# Patient Record
(demographics unavailable — no encounter records)

---

## 2024-10-10 NOTE — HISTORY OF PRESENT ILLNESS
[FreeTextEntry1] : The patient is an 81 year old female here today for a post op visit s/p closure of right breast wound (DOS: 7/9/2024). The patient is accompanied by her neighbor at this appointment. Patient states she is feeling well other than experiencing limited mobility of her right shoulder. Patient has no other concerns or complaints at this time. Patient denies bleeding, discharge, fever or chills.

## 2024-10-10 NOTE — PHYSICAL EXAM
[de-identified] : Right breast incision healing well. Small lateral seroma. No evidence of wound breakdown or signs of infection

## 2024-10-23 NOTE — HISTORY OF PRESENT ILLNESS
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 Red and itchy. No treatments tried prior to initial presentation  # Seborrheic dermatitis  Since LV, using hydrocortisone 2.5% ointment BID to affected areas on face. Also using keto shampoo 2x per week on face and scalp. Improved on face but scalp is still very itchy   #Itchy new rash on the right thigh  Started ~1 month ago, very itchy. Using Gold Bond to the area. No other treatments. Also moisturizes with Cetaphil

## 2024-10-23 NOTE — PHYSICAL EXAM
[Alert] : alert [Oriented x 3] : ~L oriented x 3 [Well Nourished] : well nourished [Conjunctiva Non-injected] : conjunctiva non-injected [No Visual Lymphadenopathy] : no visual  lymphadenopathy [No Clubbing] : no clubbing [No Edema] : no edema [No Bromhidrosis] : no bromhidrosis [No Chromhidrosis] : no chromhidrosis [FreeTextEntry3] : Erythematous greasy scaly patches on the glabella, upper lip, chin, ears, nasolabial folds admixed with hypopigmented and hyperpigmented patches  Eczematous patches and plaques on the right lateral thigh and chest  Xerosis

## 2024-11-26 NOTE — REASON FOR VISIT
Suture removal    Date/Time: 8/25/2022 9:24 AM  Performed by: Al Torres RN  Authorized by: Ashish Monroy MD   Universal Protocol:  Consent: Verbal consent obtained  Written consent not obtained  Risks and benefits: risks, benefits and alternatives were discussed  Consent given by: patient  Time out: Immediately prior to procedure a "time out" was called to verify the correct patient, procedure, equipment, support staff and site/side marked as required  Timeout called at: 8/25/2022 9:24 AM   Patient understanding: patient states understanding of the procedure being performed  Patient consent: the patient's understanding of the procedure matches consent given  Procedure consent: procedure consent matches procedure scheduled  Relevant documents: relevant documents present and verified  Test results: test results available and properly labeled  Site marked: the operative site was marked  Radiology Images displayed and confirmed  If images not available, report reviewed: imaging studies not available  Patient identity confirmed: verbally with patient        Patient location:  Clinic  Location:     Location:  Trunk    Trunk location:  Back (Lower mid back)  Procedure details: Tools used:  Suture removal kit    Wound appearance:  No sign(s) of infection, good wound healing, pink and nontender    Number of sutures removed:  5  Post-procedure details:     Post-removal:  Band-Aid applied (Vaseline applied )    Patient tolerance of procedure: Tolerated well, no immediate complications  Comments:      Patient scheduled for another MOHS procedure on 9/6 with Dr Magnolia Magana 
[FreeTextEntry2] : Right axillary LN that was + for invasive breast cancer.
[FreeTextEntry2] : Right axillary LN that was + for invasive breast cancer.

## 2024-11-26 NOTE — PHYSICAL EXAM
[de-identified] : Right chest wall; healing incisions, extensive scar tissues and pockets of seromas; "doggy ear" noted right axilla; left breast with no palpable masses no nipple retraction; no skin lesions [de-identified] : Severe right upper extremity swelling from upper arm down into hand; no redness; no palpable lesions in left axilla; extensive scarring from prior surgeries;  Bilateral lower extremity swelling 2+ [de-identified] : Restricted ROM of right arm with shoulder extension;  s/p left arm amputation

## 2024-11-26 NOTE — PHYSICAL EXAM
[de-identified] : Right chest wall; healing incisions, extensive scar tissues and pockets of seromas; "doggy ear" noted right axilla; left breast with no palpable masses no nipple retraction; no skin lesions [de-identified] : Severe right upper extremity swelling from upper arm down into hand; no redness; no palpable lesions in left axilla; extensive scarring from prior surgeries;  Bilateral lower extremity swelling 2+ [de-identified] : Restricted ROM of right arm with shoulder extension;  s/p left arm amputation

## 2024-11-26 NOTE — ASSESSMENT
[FreeTextEntry1] : 82 yo woman with history of soft tissue tumor in the left arm in 1970s, underwent surgery, s/p RT and ultimately required amputation (no chemotherapy), incidentally found to have right axillary LAD with path c/w involvement with invasive lobular carcinoma but no primary breast lesion noted. - underwent MRI breast which showed 6mm right breast lesion; biopsy of the lesion found to be benign fibroma - PET/CT in 5/2024 with no evidence of metastatic disease - [facility] Genetic testing 5/2024 - TSC2 VUS;  - given that only 1 out of 13 axillary LN involved and no invasive component in the breast, no role for radiation therapy - plan to initiate endocrine therapy with AI for period of 7-10 years (started 8/2024) -would recommend to start with anastrozole 1 mg daily with plan for about 7 years of therapy based on results of the [facility] Breast and Colorectal Cancer Study Group; ABCSG-16/SALSA published in N Engl J Med 2021;385:395-405. The study showed that extending endocrine therapy for a total of 10 provided no benefit over a total of 7 years, but 10 years of therapy was associated with greater risk of bone fractures. - risks/benefits/side effects including but not limited to increased risk of osteoporosis, worsening arthralgia/myalgia, rise in LFTs that will need to be monitored every 6 months and worsening lipid profile that will need to be monitored by PMD - BMD 8/29/2024: mild osteopenia T-1.5 - Patient and family had the opportunity to have all their questions answered to their satisfaction  #New - Lymphedema - r/o right axillary adenopathy as cause of sudden onset lymphedema - Doppler Right upper extremity to r/o DVT - Referral to [facility] Lymphedema therapy - social work to assist patient with commode in the home as patient is limited in caring for herself - Requested assistance from Breast Navigation with scheduling  # New - Lower Extremity Edema -Bilateral lower extremity Doppler - ordered  # Brain Fog - possibly due to anastrozole - discussed w/ Nellie change of treatment to a different AI - but patient prefers to stay on anastrozole at this time - patient instructed to notify our office if symptoms progress  #Post-operative Skin/seroma - Patient seen by plastics yesterday - no concern for infection - recommend f/up with plastics if changes in surgical scar or increased size of seroma

## 2024-11-26 NOTE — ASSESSMENT
[FreeTextEntry1] : 82 yo woman with history of soft tissue tumor in the left arm in 1970s, underwent surgery, s/p RT and ultimately required amputation (no chemotherapy), incidentally found to have right axillary LAD with path c/w involvement with invasive lobular carcinoma but no primary breast lesion noted. - underwent MRI breast which showed 6mm right breast lesion; biopsy of the lesion found to be benign fibroma - PET/CT in 5/2024 with no evidence of metastatic disease - [employer] Genetic testing 5/2024 - TSC2 VUS;  - given that only 1 out of 13 axillary LN involved and no invasive component in the breast, no role for radiation therapy - plan to initiate endocrine therapy with AI for period of 7-10 years (started 8/2024) -would recommend to start with anastrozole 1 mg daily with plan for about 7 years of therapy based on results of the [facility] Breast and Colorectal Cancer Study Group; ABCSG-16/SALSA published in N Engl J Med 2021;385:395-405. The study showed that extending endocrine therapy for a total of 10 provided no benefit over a total of 7 years, but 10 years of therapy was associated with greater risk of bone fractures. - risks/benefits/side effects including but not limited to increased risk of osteoporosis, worsening arthralgia/myalgia, rise in LFTs that will need to be monitored every 6 months and worsening lipid profile that will need to be monitored by PMD - BMD 8/29/2024: mild osteopenia T-1.5 - Patient and family had the opportunity to have all their questions answered to their satisfaction  #New - Lymphedema - r/o right axillary adenopathy as cause of sudden onset lymphedema - Doppler Right upper extremity to r/o DVT - Referral to [facility] Lymphedema therapy - social work to assist patient with commode in the home as patient is limited in caring for herself - Requested assistance from Breast Navigation with scheduling  # New - Lower Extremity Edema -Bilateral lower extremity Doppler - ordered  # Brain Fog - possibly due to anastrozole - discussed w/ Nellie change of treatment to a different AI - but patient prefers to stay on anastrozole at this time - patient instructed to notify our office if symptoms progress  #Post-operative Skin/seroma - Patient seen by plastics yesterday - no concern for infection - recommend f/up with plastics if changes in surgical scar or increased size of seroma

## 2024-11-26 NOTE — HISTORY OF PRESENT ILLNESS
[de-identified] : Nellie is a pleasant 81 year-old female here for initial evaluation of breast cancer referred by Dr. Chaim Shafer. She has had prior right-sided benign breast biopsy.  Her history is also significant for cancer in the muscle of the arm in the 1970's for which she underwent multiple surgeries, radiation and ultimately amputation of the left arm.  She denies palpable breast masses, nipple discharge, inversion or skin changes.    RECENT IMAGING: Bilateral screening mammogram/sonogram 2023: (BIRADS 4) -Left 10:00 N8 questionable architectural distortion--> f/u left diagnostic mammogram/sonogram -Left 10:00 N4 1.4 cm mass may correspond to mammographic finding--> f/u targeted ultrasound -Right axillary LN with cortical thickening--> ultrasound-guided biopsy  Left diagnostic mammogram/sonogram 2024: (BIRADS 4)--> f/u stereotactic biopsy -L architectural distortion posterior to course calcification -L superior N6 incidental second area of architectural distortion  Stereotactic biopsy left inner central architectural distortion 2024 (cork clip)- benign & concordant.  Ultrasound-guided bx right axillary LN 2024- metastatic carcinoma involving lymph node; ER+90%, UT+84%HER2 neg (IHC 0), Ki67 - 47%  MRI - BREAST 4/15/24  IMPRESSION: - 6 mm enhancing mass in the upper central right breast middle depth which is indeterminate. MRI guided biopsy is recommended in light of the recent diagnosis of metastatic involvement of the right axillary lymph node. - Small hematoma in the lower inner left breast with associated biopsy clip at site of recent benign stereotactic biopsy. No suspicious enhancing findings in the left breast. - 1.9 cm morphologically abnormal appearing right axillary lymph node containing a clip and corresponding to the site of biopsy-proven metastatic disease. No left axillary lymphadenopathy or internal mammary lymphadenopathy is appreciated.  24 - MRI guided right biopsy of right breast - fribroadenoma  PET/CT on 24 -  1. FDG-avid enlarged right axillary lymph node containing a biopsy clip corresponds to biopsy-proven malignancy. 2. Nonspecific 3 mm right upper lobe pulmonary nodule, too small to characterize with PET. A 6 month follow-up with dedicated CT of chest may be obtained for further evaluation 3. FDG-avid advanced degenerative changes, right glenohumeral joint. 4. Nonspecific focus of mild FDG activity, posterolateral aspect right fifth rib, without corresponding abnormality on CT. Correlate clinically for history of trauma/pain.  24 - Breast biopsies Final Diagnosis 1. Breast, right, 11 o'clock 7 cmfn, core biopsy - Benign breast tissue with fibroadenomatoid change - Scar tissue and fat necrosis, compatible with biopsy site changes  2. Breast, left, 10 o'clock 5 cmfn, core biopsy: - Benign breast tissue with fibroadenomatoid change with stromal calcifications - Scar tissue and fat necrosis, compatible with biopsy site changes  Underwent Right breast mastectomy with Dr. Shafer on 24 - final path Breast, right, mastectomy and ALND - carcinoma with lobular morphology involving one of thirteen lymph nodes (); measures 15 mm in greatest dimension; no extracapsular extension - Within the breast tissue; LCIS and ADH; no invasive component seen  RISK ASSESSMENT , 1st pregnancy at age 21, Menarche at age 13; unknown menopause as she had hysterectomy for fibroids Family Hx: maternal grandmother had tumor on her back and mother unspecified malignancy (but not breast or ovarian ca).  [de-identified] : 11/26/2024 Nellie presents emergently for new onset right upper extremity lymphedema that start 3 days ago.  She notes heaviness, no pain.  She is very distressed by this symptom given that she had a right arm amputation and this is her only means for self care.  She is unable to dress, bathe herself without help.  She has an aide 4/7 days of the week.  Started anastrozole approximately 8/18/2024 - c/o brain fog and fatigue; no arthralgias, no hotflashes c/o rash on face, chest and neck - being treated by Dr. Templeton - patient notes improvement in rash since starting medication from derm notes restricted range of motion of right arm  also notes excess skin laterally under right arm and swelling of right mastectomy site  c/o new right upper extremity swellig since Monday - no pain, but restriction of motion commode and shower chair**  [FreeTextEntry1] : Limited by pain from left upper extremity amputation

## 2024-11-26 NOTE — HISTORY OF PRESENT ILLNESS
[de-identified] : Nellie is a pleasant 81 year-old female here for initial evaluation of breast cancer referred by Dr. Chaim Shafer. She has had prior right-sided benign breast biopsy.  Her history is also significant for cancer in the muscle of the arm in the 1970's for which she underwent multiple surgeries, radiation and ultimately amputation of the left arm.  She denies palpable breast masses, nipple discharge, inversion or skin changes.    RECENT IMAGING: Bilateral screening mammogram/sonogram 2023: (BIRADS 4) -Left 10:00 N8 questionable architectural distortion--> f/u left diagnostic mammogram/sonogram -Left 10:00 N4 1.4 cm mass may correspond to mammographic finding--> f/u targeted ultrasound -Right axillary LN with cortical thickening--> ultrasound-guided biopsy  Left diagnostic mammogram/sonogram 2024: (BIRADS 4)--> f/u stereotactic biopsy -L architectural distortion posterior to course calcification -L superior N6 incidental second area of architectural distortion  Stereotactic biopsy left inner central architectural distortion 2024 (cork clip)- benign & concordant.  Ultrasound-guided bx right axillary LN 2024- metastatic carcinoma involving lymph node; ER+90%, MI+84%HER2 neg (IHC 0), Ki67 - 47%  MRI - BREAST 4/15/24  IMPRESSION: - 6 mm enhancing mass in the upper central right breast middle depth which is indeterminate. MRI guided biopsy is recommended in light of the recent diagnosis of metastatic involvement of the right axillary lymph node. - Small hematoma in the lower inner left breast with associated biopsy clip at site of recent benign stereotactic biopsy. No suspicious enhancing findings in the left breast. - 1.9 cm morphologically abnormal appearing right axillary lymph node containing a clip and corresponding to the site of biopsy-proven metastatic disease. No left axillary lymphadenopathy or internal mammary lymphadenopathy is appreciated.  24 - MRI guided right biopsy of right breast - fribroadenoma  PET/CT on 24 -  1. FDG-avid enlarged right axillary lymph node containing a biopsy clip corresponds to biopsy-proven malignancy. 2. Nonspecific 3 mm right upper lobe pulmonary nodule, too small to characterize with PET. A 6 month follow-up with dedicated CT of chest may be obtained for further evaluation 3. FDG-avid advanced degenerative changes, right glenohumeral joint. 4. Nonspecific focus of mild FDG activity, posterolateral aspect right fifth rib, without corresponding abnormality on CT. Correlate clinically for history of trauma/pain.  24 - Breast biopsies Final Diagnosis 1. Breast, right, 11 o'clock 7 cmfn, core biopsy - Benign breast tissue with fibroadenomatoid change - Scar tissue and fat necrosis, compatible with biopsy site changes  2. Breast, left, 10 o'clock 5 cmfn, core biopsy: - Benign breast tissue with fibroadenomatoid change with stromal calcifications - Scar tissue and fat necrosis, compatible with biopsy site changes  Underwent Right breast mastectomy with Dr. Shafer on 24 - final path Breast, right, mastectomy and ALND - carcinoma with lobular morphology involving one of thirteen lymph nodes (); measures 15 mm in greatest dimension; no extracapsular extension - Within the breast tissue; LCIS and ADH; no invasive component seen  RISK ASSESSMENT , 1st pregnancy at age 21, Menarche at age 13; unknown menopause as she had hysterectomy for fibroids Family Hx: maternal grandmother had tumor on her back and mother unspecified malignancy (but not breast or ovarian ca).  [de-identified] : 11/26/2024 Nellie presents emergently for new onset right upper extremity lymphedema that start 3 days ago.  She notes heaviness, no pain.  She is very distressed by this symptom given that she had a right arm amputation and this is her only means for self care.  She is unable to dress, bathe herself without help.  She has an aide 4/7 days of the week.  Started anastrozole approximately 8/18/2024 - c/o brain fog and fatigue; no arthralgias, no hotflashes c/o rash on face, chest and neck - being treated by Dr. Templeton - patient notes improvement in rash since starting medication from derm notes restricted range of motion of right arm  also notes excess skin laterally under right arm and swelling of right mastectomy site  c/o new right upper extremity swellig since Monday - no pain, but restriction of motion commode and shower chair**  [FreeTextEntry1] : Limited by pain from left upper extremity amputation

## 2024-11-26 NOTE — REVIEW OF SYSTEMS
[FreeTextEntry9] : arthritis pain; s/p left arm amputationg;  [de-identified] : lymphedema right arm

## 2024-11-26 NOTE — REVIEW OF SYSTEMS
[FreeTextEntry9] : arthritis pain; s/p left arm amputationg;  [de-identified] : lymphedema right arm

## 2025-01-06 NOTE — HISTORY OF PRESENT ILLNESS
[de-identified] : Nellie is a pleasant 82-year-old female, referred by YOSELIN Williamson for consultation regarding R breast cancer, now s/p R radical mastectomy on 24, here for a follow-up visit.   Prior right-sided benign breast biopsy.   Bilateral screening mammogram/sonogram 2023: (BIRADS 4) -Left 10:00 N8 questionable architectural distortion--> f/u left diagnostic mammogram/sonogram -Left 10:00 N4 1.4 cm mass may correspond to mammographic finding--> f/u targeted ultrasound -Right axillary LN with cortical thickening--> ultrasound-guided biopsy  Left diagnostic mammogram/sonogram 2024: (BIRADS 4)--> f/u stereotactic biopsy -L architectural distortion posterior to course calcification -L superior N6 incidental second area of architectural distortion  Stereotactic biopsy left inner central architectural distortion 2024 (cork clip)- benign & concordant.  Ultrasound-guided bx right axillary LN 2024- metastatic carcinoma involving lymph node, ER+ (>90%), LA+(84%), HER2 negative.  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 Social Hx: nonsmoker, ETOH rarely, assisted by SIMONA Ramos two days per week, lives alone ECO-1 limited by pain from R shoulder arthritis  She denies palpable breast masses, nipple discharge, inversion or skin changes.   Breast MRI 2024: -R upper central 6 mm enhancing mass, indeterminate--> f/u MRI biopsy -L lower inner small hematoma @ site of recent benign stereo bx -R 1.9 cm abnormal appearing axillary LN at site of bx-proven metastatic disease  MRI-guided right breast biopsy 2024: -R upper central (buckle): fibroadenoma - benign & concordant   genetics 2024 - negative for BRCA, VUS to TSC2  PET/CT 2024 - FDG-avid enlarged right axillary lymph node containing a bx clip, corresponds to a bx proven malignancy  - nonspecific 3 mm RUL pulm nodule, too small to characterize w/ PET -> f/u chest CT in 6 months  - FDG avid degenerative changes, right glenohumeral joint - nonspecific focus of mild FDG activity, posterolateral aspect R 5th rib w/o corresponding CT abnormality, correlate clinically for hx of trauma/pain - the remainder of the study demonstrates no evidence of FDG- avid disease, a primary lesion is not identified   2024- Nellie returns to discuss recent imaging and next steps.  She is accompanied by her HHA Rachel and her friend and neighbor Nancy who is a breast cancer survivor and underwent surgery with Dr Jud Valle in . She was evaluated by Dr. Alejandra Harrison on 2024 and pending PET/CT findings would finalize plan.  If localized disease, would plan for surgical excision, adjuvant RT and endocrine therapy with AI for period of 7-10 years.  If she has metastatic disease, would plan for AI with ribociclib.  She also sent genetic testing which is still pending.  We discussed the recent MRI findings, and MRI breast biopsy results being benign.  We will review the imaging with radiology and assess is additional imaging will better identify the potential primary lesion.  We also discussed the treatment algorithm for breast cancers of unknown primary.    B/L U/S & Right diagnostic mammo 2024 - area of distortion in the R UOQ, appears more prominent than prior mammo, likely corresponds to an irregular hypoechoic focus at 11:00 on sono -> f/u R U/S bx recommended - newly seen irregular suspicious hypoechoic mass to L 10:00 N5 -> f/u L U/S bx recommended BIRADS 4C   U/S biopsies 2024 - R 11:00 N7 (ribbon): fibroadenoma - DISCORDANT* - L 10:00 N5 (coil): benign breast tissue, benign & concordant  2024 - Nellie returns to discuss next steps after her recent biopsies. She is accompanied by her son Ted and his wife, Catherine, who is also Nellie's HCP. We discussed the right breast cancer with john disease with an unclear primary site.  We discussed options including systemic therapy, close observation, axillary dissection, and a modified radical mastectomy.  Nellie is in favor of a modified radical mastectomy given her desire for definitive treatment. We discussed the risks, benefits and alternatives of the procedure.  We also discussed post operative expectations and possible complications.  She expresses understanding and agrees to proceed.  **SURGERY** Nellie is s/p a right modified radical mastectomy on 2024, path: no residual invasive carcinoma to breast, metastatic carcinoma w/ lobular features to  LN (1.5 cm in greatest dimension), ADH, LCIS, FEA & benign nipple & skin, pT0 N1a   2024 - Nellie returns for an initial post-op visit, she is recovering appropriately. She followed w/ Dr. Ny post-op and had one drain removed, the second drain fell out incidentally earlier this week, but the site continued draining on its own until it tapered off. There is a large seroma to the chest wall, but it is not bothersome to her at all. Overall, she is recovering well.  We discussed Nellie's final pathology. She will follow-up with Dr. Harrison to discuss adjuvant options. She will also continue follow-up with Dr. Ny to manage the incision & chest wall seroma.   Nellie is s/p IR drainage of the chest wall seroma on 2024 (w/ approx 268 cc drained) She also consulted w/ Dr. Harrison post-op who recommends Anastrozole for adjuvant therapy   2024 - Nellie returns for ongoing post-op care, she is scheduled to see Dr. Ny tomorrow as well. She started Anastrozole under Dr. Harrison and is tolerating well. Nellie will follow up in 4 months.  She will undergo left breast ultrasound in November.   Nellie was admitted to [facility] in late 2024 w/ RUE & B/L LE edema and worsening PRETTY - found to have an NSTEMI s/p cath on 2024 w/ PCI stent, discharged home on ASA & Plavix.

## 2025-01-06 NOTE — ASSESSMENT
[FreeTextEntry1] :   All medical entries were at my, Dr. Chaim Shafer, direction.  I have reviewed the chart and agree that the record accurately reflects my personal performance of the history, physical exam, assessment and plan.  Our office nurse practitioner was present for the duration of the office visit.

## 2025-01-06 NOTE — CONSULT LETTER
[Dear  ___] : Dear  [unfilled], [Consult Letter:] : I had the pleasure of evaluating your patient, [unfilled]. [Please see my note below.] : Please see my note below. [Consult Closing:] : Thank you very much for allowing me to participate in the care of this patient.  If you have any questions, please do not hesitate to contact me. [Sincerely,] : Sincerely, [FreeTextEntry2] : Edward Williamson PA [DrDahiana  ___] : Dr. HUTCHINSON [FreeTextEntry3] : Chaim Shafer MD Surgical Oncology [facility] Office: [phone] Cell: [phone]

## 2025-01-06 NOTE — PHYSICAL EXAM
[FreeTextEntry1] : SS present during physical exam.  [Normal] : well developed, well nourished, in no acute distress [de-identified] : right chest wall with large seroma, no redness or tenderness

## 2025-01-22 NOTE — PHYSICAL EXAM
[FreeTextEntry1] : SS present during physical exam.  [de-identified] : right chest wall with large seroma, no redness or tenderness

## 2025-01-22 NOTE — CONSULT LETTER
[FreeTextEntry2] : Edward Williamson PA [FreeTextEntry3] : Chaim Shafer MD Surgical Oncology [facility] Office: [phone] Cell: [phone]

## 2025-01-22 NOTE — HISTORY OF PRESENT ILLNESS
[de-identified] : Nellie is a pleasant 82-year-old female, referred by YOSELIN Williamson for consultation regarding R breast cancer, now s/p R radical mastectomy on 24, here for a follow-up visit.   Prior right-sided benign breast biopsy.   Bilateral screening mammogram/sonogram 2023: (BIRADS 4) -Left 10:00 N8 questionable architectural distortion--> f/u left diagnostic mammogram/sonogram -Left 10:00 N4 1.4 cm mass may correspond to mammographic finding--> f/u targeted ultrasound -Right axillary LN with cortical thickening--> ultrasound-guided biopsy  Left diagnostic mammogram/sonogram 2024: (BIRADS 4)--> f/u stereotactic biopsy -L architectural distortion posterior to course calcification -L superior N6 incidental second area of architectural distortion  Stereotactic biopsy left inner central architectural distortion 2024 (cork clip)- benign & concordant.  Ultrasound-guided bx right axillary LN 2024- metastatic carcinoma involving lymph node, ER+ (>90%), DE+(84%), HER2 negative.  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 Social Hx: nonsmoker, ETOH rarely, assisted by SIMONA Ramos two days per week, lives alone ECO-1 limited by pain from R shoulder arthritis  She denies palpable breast masses, nipple discharge, inversion or skin changes.   Breast MRI 2024: -R upper central 6 mm enhancing mass, indeterminate--> f/u MRI biopsy -L lower inner small hematoma @ site of recent benign stereo bx -R 1.9 cm abnormal appearing axillary LN at site of bx-proven metastatic disease  MRI-guided right breast biopsy 2024: -R upper central (buckle): fibroadenoma - benign & concordant   genetics 2024 - negative for BRCA, VUS to TSC2  PET/CT 2024 - FDG-avid enlarged right axillary lymph node containing a bx clip, corresponds to a bx proven malignancy  - nonspecific 3 mm RUL pulm nodule, too small to characterize w/ PET -> f/u chest CT in 6 months  - FDG avid degenerative changes, right glenohumeral joint - nonspecific focus of mild FDG activity, posterolateral aspect R 5th rib w/o corresponding CT abnormality, correlate clinically for hx of trauma/pain - the remainder of the study demonstrates no evidence of FDG- avid disease, a primary lesion is not identified   2024- Nellie returns to discuss recent imaging and next steps.  She is accompanied by her HHA Rachel and her friend and neighbor Nancy who is a breast cancer survivor and underwent surgery with Dr Jud Valle in . She was evaluated by Dr. Alejandra Harrison on 2024 and pending PET/CT findings would finalize plan.  If localized disease, would plan for surgical excision, adjuvant RT and endocrine therapy with AI for period of 7-10 years.  If she has metastatic disease, would plan for AI with ribociclib.  She also sent genetic testing which is still pending.  We discussed the recent MRI findings, and MRI breast biopsy results being benign.  We will review the imaging with radiology and assess is additional imaging will better identify the potential primary lesion.  We also discussed the treatment algorithm for breast cancers of unknown primary.    B/L U/S & Right diagnostic mammo 2024 - area of distortion in the R UOQ, appears more prominent than prior mammo, likely corresponds to an irregular hypoechoic focus at 11:00 on sono -> f/u R U/S bx recommended - newly seen irregular suspicious hypoechoic mass to L 10:00 N5 -> f/u L U/S bx recommended BIRADS 4C   U/S biopsies 2024 - R 11:00 N7 (ribbon): fibroadenoma - DISCORDANT* - L 10:00 N5 (coil): benign breast tissue, benign & concordant  2024 - Nellie returns to discuss next steps after her recent biopsies. She is accompanied by her son Ted and his wife, Catherine, who is also Nellie's HCP. We discussed the right breast cancer with john disease with an unclear primary site.  We discussed options including systemic therapy, close observation, axillary dissection, and a modified radical mastectomy.  Nellie is in favor of a modified radical mastectomy given her desire for definitive treatment. We discussed the risks, benefits and alternatives of the procedure.  We also discussed post operative expectations and possible complications.  She expresses understanding and agrees to proceed.  **SURGERY** Nellie is s/p a right modified radical mastectomy on 2024, path: no residual invasive carcinoma to breast, metastatic carcinoma w/ lobular features to  LN (1.5 cm in greatest dimension), ADH, LCIS, FEA & benign nipple & skin, pT0 N1a   2024 - Nellie returns for an initial post-op visit, she is recovering appropriately. She followed w/ Dr. Ny post-op and had one drain removed, the second drain fell out incidentally earlier this week, but the site continued draining on its own until it tapered off. There is a large seroma to the chest wall, but it is not bothersome to her at all. Overall, she is recovering well.  We discussed Nellie's final pathology. She will follow-up with Dr. Harrison to discuss adjuvant options. She will also continue follow-up with Dr. Ny to manage the incision & chest wall seroma.   Nellie is s/p IR drainage of the chest wall seroma on 2024 (w/ approx 268 cc drained) She also consulted w/ Dr. Harrison post-op who recommends Anastrozole for adjuvant therapy   2024 - Nellie returns for ongoing post-op care, she is scheduled to see Dr. Ny tomorrow as well. She started Anastrozole under Dr. Harrison and is tolerating well. Nellie will follow up in 4 months.  She will undergo left breast ultrasound in November.   Nellie was admitted to [facility] in late 2024 w/ RUE & B/L LE edema and worsening PRETTY - found to have an NSTEMI s/p cath on 2024 w/ PCI stent, discharged home on ASA & Plavix.

## 2025-03-03 NOTE — HISTORY OF PRESENT ILLNESS
[de-identified] : Nellie is a pleasant 82-year-old female, referred by YOSELIN Williamson for consultation regarding R breast cancer, now s/p R radical mastectomy on 24, here for a follow-up visit.   Prior right-sided benign breast biopsy.   Bilateral screening mammogram/sonogram 2023: (BIRADS 4) -Left 10:00 N8 questionable architectural distortion--> f/u left diagnostic mammogram/sonogram -Left 10:00 N4 1.4 cm mass may correspond to mammographic finding--> f/u targeted ultrasound -Right axillary LN with cortical thickening--> ultrasound-guided biopsy  Left diagnostic mammogram/sonogram 2024: (BIRADS 4)--> f/u stereotactic biopsy -L architectural distortion posterior to course calcification -L superior N6 incidental second area of architectural distortion  Stereotactic biopsy left inner central architectural distortion 2024 (cork clip)- benign & concordant.  Ultrasound-guided bx right axillary LN 2024- metastatic carcinoma involving lymph node, ER+ (>90%), AL+(84%), HER2 negative.  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 Social Hx: nonsmoker, ETOH rarely, assisted by SIMONA Ramos two days per week, lives alone ECO-1 limited by pain from R shoulder arthritis  She denies palpable breast masses, nipple discharge, inversion or skin changes.   Breast MRI 2024: -R upper central 6 mm enhancing mass, indeterminate--> f/u MRI biopsy -L lower inner small hematoma @ site of recent benign stereo bx -R 1.9 cm abnormal appearing axillary LN at site of bx-proven metastatic disease  MRI-guided right breast biopsy 2024: -R upper central (buckle): fibroadenoma - benign & concordant   genetics 2024 - negative for BRCA, VUS to TSC2  PET/CT 2024 - FDG-avid enlarged right axillary lymph node containing a bx clip, corresponds to a bx proven malignancy  - nonspecific 3 mm RUL pulm nodule, too small to characterize w/ PET -> f/u chest CT in 6 months  - FDG avid degenerative changes, right glenohumeral joint - nonspecific focus of mild FDG activity, posterolateral aspect R 5th rib w/o corresponding CT abnormality, correlate clinically for hx of trauma/pain - the remainder of the study demonstrates no evidence of FDG- avid disease, a primary lesion is not identified   2024- Nellie returns to discuss recent imaging and next steps.  She is accompanied by her HHA Rachel and her friend and neighbor Nancy who is a breast cancer survivor and underwent surgery with Dr Jud Valle in . She was evaluated by Dr. Alejandra Harrison on 2024 and pending PET/CT findings would finalize plan.  If localized disease, would plan for surgical excision, adjuvant RT and endocrine therapy with AI for period of 7-10 years.  If she has metastatic disease, would plan for AI with ribociclib.  She also sent genetic testing which is still pending.  We discussed the recent MRI findings, and MRI breast biopsy results being benign.  We will review the imaging with radiology and assess is additional imaging will better identify the potential primary lesion.  We also discussed the treatment algorithm for breast cancers of unknown primary.    B/L U/S & Right diagnostic mammo 2024 - area of distortion in the R UOQ, appears more prominent than prior mammo, likely corresponds to an irregular hypoechoic focus at 11:00 on sono -> f/u R U/S bx recommended - newly seen irregular suspicious hypoechoic mass to L 10:00 N5 -> f/u L U/S bx recommended BIRADS 4C   U/S biopsies 2024 - R 11:00 N7 (ribbon): fibroadenoma - DISCORDANT* - L 10:00 N5 (coil): benign breast tissue, benign & concordant  2024 - Nellie returns to discuss next steps after her recent biopsies. She is accompanied by her son Ted and his wife, Catherine, who is also Nellie's HCP. We discussed the right breast cancer with john disease with an unclear primary site.  We discussed options including systemic therapy, close observation, axillary dissection, and a modified radical mastectomy.  Nellie is in favor of a modified radical mastectomy given her desire for definitive treatment. We discussed the risks, benefits and alternatives of the procedure.  We also discussed post operative expectations and possible complications.  She expresses understanding and agrees to proceed.  **SURGERY** Nellie is s/p a right modified radical mastectomy on 2024, path: no residual invasive carcinoma to breast, metastatic carcinoma w/ lobular features to  LN (1.5 cm in greatest dimension), ADH, LCIS, FEA & benign nipple & skin, pT0 N1a   2024 - Nellie returns for an initial post-op visit, she is recovering appropriately. She followed w/ Dr. Ny post-op and had one drain removed, the second drain fell out incidentally earlier this week, but the site continued draining on its own until it tapered off. There is a large seroma to the chest wall, but it is not bothersome to her at all. Overall, she is recovering well.  We discussed Nellie's final pathology. She will follow-up with Dr. Harrison to discuss adjuvant options. She will also continue follow-up with Dr. Ny to manage the incision & chest wall seroma.   Nellie is s/p IR drainage of the chest wall seroma on 2024 (w/ approx 268 cc drained) She also consulted w/ Dr. Harrison post-op who recommends Anastrozole for adjuvant therapy   2024 - Nellie returns for ongoing post-op care, she is scheduled to see Dr. Ny tomorrow as well. She started Anastrozole under Dr. Harrison and is tolerating well. Nellie will follow up in 4 months.  She will undergo left breast ultrasound in November.   Nellie was admitted to [facility] in late 2024 w/ RUE & B/L LE edema and worsening PRETTY - found to have an NSTEMI s/p cath on 2024 w/ PCI stent, discharged to [facility] on ASA & Plavix.  She also had a B/L LE thigh MRI that showed R>L diffuse edema within the proximal muscle bellies c/f active myositis and MRI neck showed diffuse edema & enhancement to upper thoracic & R shoulder soft tissues likely inflammatory, IVIg was given - - muscle bx was recommended but deferred as outpatient given risks v. benefits.   3/3/2025 - Nellie is here for ongoing follow-up, her post hospitalization visit has been delayed r/t transportation coordination at her [facility].  She has continued to follow-up with rheumatology.  She is feeling slightly better.  She will hopefully be getting discharged from [facility] in 3 weeks.

## 2025-03-03 NOTE — ASSESSMENT
[FreeTextEntry1] :  Now will follow-up with rheumatology.  She will let us know if there is any need for a muscle biopsy.  She will undergo a left mammogram and sonogram in the near future.  Provided there are no worrisome findings, she will follow-up with us in 4 months.  All medical entries were at my, Dr. Chaim Shafer, direction.  I have reviewed the chart and agree that the record accurately reflects my personal performance of the history, physical exam, assessment and plan.  Our office nurse practitioner was present for the duration of the office visit.

## 2025-03-03 NOTE — CONSULT LETTER
[FreeTextEntry2] : Edward Williamson PA [FreeTextEntry3] : Chaim Shafer MD Surgical Oncology [facility] Office: [phone] Cell: [phone]

## 2025-03-03 NOTE — PHYSICAL EXAM
[Normal] : supple, no neck mass and thyroid not enlarged [Normal Neck Lymph Nodes] : normal neck lymph nodes  [Normal Supraclavicular Lymph Nodes] : normal supraclavicular lymph nodes [Normal Groin Lymph Nodes] : normal groin lymph nodes [Normal] : oriented to person, place and time, with appropriate affect [FreeTextEntry1] : Medical staff present during physical exam.  [Normal Axillary Lymph Nodes] : normal axillary lymph nodes [de-identified] : right chest wall incision well healed.  Left breast without any dominant masses.

## 2025-03-03 NOTE — PHYSICAL EXAM
[Normal] : supple, no neck mass and thyroid not enlarged [Normal Neck Lymph Nodes] : normal neck lymph nodes  [Normal Supraclavicular Lymph Nodes] : normal supraclavicular lymph nodes [Normal Groin Lymph Nodes] : normal groin lymph nodes [Normal] : oriented to person, place and time, with appropriate affect [FreeTextEntry1] : Medical staff present during physical exam.  [Normal Axillary Lymph Nodes] : normal axillary lymph nodes [de-identified] : right chest wall incision well healed.  Left breast without any dominant masses.

## 2025-03-03 NOTE — CONSULT LETTER
[FreeTextEntry2] : Edward Williamson PA [FreeTextEntry3] : Chaim Shafer MD Surgical Oncology [facility] Office: [phone] Cell: [phone]

## 2025-05-09 NOTE — ASSESSMENT
[FreeTextEntry1] : 81 y/o F w DM =facial rash, v sign, Holster sign, mechanics hands. =bilateral proximal muscle weakness of upper and lower limbs, dysphagia, dysphonia =12/24 KATHE : 1:2560, dsDNA negative, complements normal, sjogrens Ab normal, smith , RNP, myomarker 3 negative. CRP 21.9; CK : 3045>2569>900. Elevated AST, ALT Skin bx: consistent with psoriasis. Not interface dermatitis seen in dermatomyositis skin lesion. Very strange. =MRI thigh 12/24: diffuse edema proximal muscles , focal 1.4cm collection within distal portion of the anterior thigh musculature at the level of the mid and distal femoral diaphysis =recent hx of breast ca 2024 in remission; let arm tumor s/p amputation =lost to f/u  Unknown why pt lost to f/u...Counseled that pt is very ill and needs regular f/u. Pt needs to start IVIG infusion ASAP. Offered steroids but pt declined.  Counseled if pt has worsening dysphagia, SOB, needs to go to ER immediately.   Pt high risk category according to International Guidelines for Malignancy Screening for Myositis 2023. Will need enhanced and basic cancer screening for 3 days.   Plan Labs w mm enzymes start IVIG 2g/kg over 2 days RTO 4 weeks

## 2025-05-09 NOTE — PHYSICAL EXAM
[General Appearance - Well Nourished] : well nourished [General Appearance - Well Developed] : well developed [Sclera] : the sclera and conjunctiva were normal [Auscultation Breath Sounds / Voice Sounds] : lungs were clear to auscultation bilaterally [Heart Sounds] : normal S1 and S2 [Heart Sounds Gallop] : no gallops [Murmurs] : no murmurs [Abdomen Soft] : soft [Abdomen Tenderness] : non-tender [] : no hepato-splenomegaly [Cervical Lymph Nodes Enlarged Posterior Bilaterally] : posterior cervical [Cervical Lymph Nodes Enlarged Anterior Bilaterally] : anterior cervical [Supraclavicular Lymph Nodes Enlarged Bilaterally] : supraclavicular [FreeTextEntry1] : RUE 5/5; iliopsoas 2/5 [Oriented To Time, Place, And Person] : oriented to person, place, and time

## 2025-05-09 NOTE — HISTORY OF PRESENT ILLNESS
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 Further work up revealed elevated Troponin , ACS rule out by cardiology. Rheum consulted as she also had CK elevation thought to be secondary to myositis. Impression: Patient with insidious onset leg weakness with bulbar symptoms, skin rash initially thought to be dermatomyositis but skin biopsy showing psoriasis. Possible patient may have two different skin lesions psoriasis and dermatomyositis , psoriasis lesion was biopsied. Patient with some improvement in muscle strength and significant improvement in skin lesions after steroids and IVIG. MRI with Focal 1.4 cm collection within the distal portion of the anterior thigh ,musculature , and diffuse proximal muscle edema consistent with myositis. Collection raising concern for infection. However , IR deferred FNA given patient is higher risk for bleeding on DAPT.  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 -Bilateral proximal muscle weakness of upper and lower limbs, dysphagia, dysphonia -KATHE : 1:2560, dsDNA negative, complements normal, sjogrens Ab normal, perez , RNP negative. CRP 21.9 -CK : 3045>2569>900. Elevated AST, ALT -Normal immunoglobulins, HBV , HCV serology negative for infection, Normal KLC ratio -CT chest: Peribronchial vascular thickening in the right middle and lower lobes. Bibasilar confluent and linear opacities. -CT neck :There are nonspecific subcentimeter upper, mid and lower cervical lymph nodes bilaterally. There are no pathologically enlarged lymph nodes by size criteria. -Esophagram :Focal narrowing of the cervical esophagus, with filling defect of questionable significance. -Skin bx: consistent with psoriasis. Not interface dermatitis seen in dermatomyositis skin lesion. Very strange. -MRI thigh: diffuse edema proximal muscles , focal 1.4cm collection within distal portion of the anterior thigh musculature at the level of the mid and distal femoral diaphysis  Given skin bx oddly consistent w psoriasis, and now DM, do want to pursue muscle bx if possible. IR unable to bx given DAPT. Surgery deferred muscle biopsy given high risk under GA.  Recommendations: -Consider neurology evaluation given persistent weakness. -Recommend MRI whole spine to rule out structural etiology ?malignancy ? mets Repeat CK and CKMB -Continue prednisone 60mg for 4 days > 50mg from this friday for one week> 40mg for one week >30mg until next rheum follow up >follow up with Dr Covington. -C/w PPI and PCP ppx on high dose steroids   current events:  =pt says rash is worse, in hair, hands =no weakness; has some trouble w swallowing sometimes regurgiates =pt sounds hoarse =no fevers, SOB, CP, abdominal pain, n/v/d No

## 2025-05-09 NOTE — DATA REVIEWED
[FreeTextEntry1] : labs 12/24  myomarker 3, DsDNA, CB, Sjogren's, HMGCr negative CRP 21 SPEP hypoalbuminemia; immunofixation negative  skin bx 12/24 Surgical Pathology Report - Auth (Verified)  Specimen(s) Submitted 1-Right neck punch  Final Diagnosis Right neck, punch biopsy - Consistent with psoriasis  Note:  PAS stain fails to reveal fungal elements.   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 Worsening extremity swelling. 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  COMPARISON: CT chest from 11/29/2024. CT abdomen and pelvis from  3/27/2024.  CONTRAST/COMPLICATIONS: IV Contrast: Omnipaque 350. 90 cc administered. 10 cc discarded. Oral Contrast: None.  PROCEDURE: CT of the Chest, Abdomen and Pelvis was performed. Sagittal and coronal reformats were performed.  FINDINGS: CHEST: LUNGS AND LARGE AIRWAYS: Patent central airways. Peribronchial vascular  thickening in the right middle and lower lobes. Bibasilar confluent and  linear opacities. PLEURA: No pleural effusion. VESSELS: Coronary artery stent. Short segment focal narrowing of the  right axillary vein (3:55). HEART: Heart size is normal. No pericardial effusion. MEDIASTINUM AND LYNNE: No lymphadenopathy. CHEST WALL AND LOWER NECK: Right mastectomy. Diffuse subcutaneous edema  in the partially imaged right upper extremity. Left upper extremity and  scapular amputation. Indeterminate nodular soft tissue attenuation focus  again noted abutting the anterolateral aspect aspect of the left first  rib as described on the CT of the neck. Ingested material in the mid and  distal esophagus.  ABDOMEN AND PELVIS: LIVER: Thin perihepatic calcifications are unchanged. Scattered calcified  granulomas. BILE DUCTS: Normal caliber. GALLBLADDER: Cholecystectomy. SPLEEN: Within normal limits. PANCREAS: Within normal limits. ADRENALS: Within normal limits. KIDNEYS/URETERS: Within normal limits.  BLADDER: Within normal limits. REPRODUCTIVE ORGANS: Hysterectomy.  BOWEL: No bowel obstruction. Appendix is not visualized. No evidence of  inflammation in the pericecal region. Scattered surgical clips and suture  material. PERITONEUM/RETROPERITONEUM: No ascites. VESSELS: Atherosclerotic changes. LYMPH NODES: No lymphadenopathy. ABDOMINAL WALL: Postsurgical changes. BONES: Degenerative changes. Chronic rib fractures. Left hip arthroplasty.  IMPRESSION: *  Diffuse subcutaneous edema in the partially imaged right upper  extremity. *  Indeterminate nodular soft tissue attenuation focus again noted  abutting the anterolateral aspect aspect of the left first rib as  described on the CT of the neck. *  No acute finding in the abdomen or pelvis.    --- End of Report ---     NATHALIA HUTCHINSON MD; Resident Radiologist This document has been electronically signed. ZACHARY SULLIVAN MD; Attending Radiologist This document has been electronically signed. Dec  8 2024 10:21AM   ACC: [id number] EXAM:  MR LWR EXT JOINT IC BI   ORDERED BY: AURY HORN   PROCEDURE DATE:  12/10/2024      INTERPRETATION:  MRI of bilateral femurs  TECHNIQUE: Multiplanar multisequence MRI of bilateral femurs with and  without contrast.  Contrast: Gadolinium base. Administered: 7 cc. This current: 0.5 cc.  COMPARISON: CT of the abdomen performed 12/7/2024.  INDICATION: Myositis. Patient with history of breast cancer.  FINDINGS:  Osseous structures: Left total hip arthroplasty with associated artifact  limiting evaluation. No suspicious osseous lesion. No fracture.  Muscles and tendons: There is right greater than left diffuse edema  within the muscle bellies of the gluteus kiesha, medius, and minimus.  There is right greater than left adductor muscle belly edema. Bilateral  edema within the iliopsoas muscle bellies. Diffuse edema within the  sartorius and rectus femoris muscle bellies, bilaterally. Mild edema  within the proximal muscle bellies of the hamstring muscles bilaterally.  There is relative sparing of the distal adductor and vastus musculature.  There is no high-grade tendon tear. There is a 1.4 x 1.0 x 1.2 cm focal  area of hyperintense rim low internal signal within the distal portion of  the rectus femoris musculature (series 6, image 55).  Subcutaneous soft tissues: There is edema within the subcutaneous soft  tissues at the anterolateral thigh bilaterally, left greater than right.  No definitive organized collection.  Neurovascular structures: Preserved.  Intra-abdominal contents: Normal.  IMPRESSION:  MRI of bilateral thighs demonstrates right greater than left near diffuse  edema within the proximal muscle bellies as described with relative  sparing distally within the thigh. Findings are felt to represent active  myositis in the appropriate clinical context.  Focal 1.4 cm collection within the distal portion of the anterior thigh  musculature at the level of the mid to distal femoral diaphysis. Findings  may represent an abscess. Recommend correlation with focused ultrasound.  Left total hip arthroplasty. No suspicious osseous lesion.  --- End of Report ---      LOPEZ LEVINE MD; Attending Radiologist This document has been electronically signed. Dec 11 2024  8:57AM

## 2025-06-20 NOTE — HISTORY OF PRESENT ILLNESS
[FreeTextEntry1] : 81 y/o F DM  #DM =facial rash, v sign, Holster sign, mechanics hands. =bilateral proximal muscle weakness of upper and lower limbs, dysphagia, dysphonia =12/24 KATHE : 1:2560, dsDNA negative, complements normal, sjogrens Ab normal, smith , RNP, myomarker 3 negative. CRP 21.9; CK : 3045>2569>900. Elevated AST, ALT Skin bx: consistent with psoriasis. Not interface dermatitis seen in dermatomyositis skin lesion. Very strange. =MRI thigh 12/24: diffuse edema proximal muscles , focal 1.4cm collection within distal portion of the anterior thigh musculature at the level of the mid and distal femoral diaphysis =recent hx of breast ca 2024 in remission; let arm tumor s/p amputation  #Malignancy screening =high risk category according to International Guidelines for Malignancy Screening for Myositis 2023 =: wnl  =CT neck :There are nonspecific subcentimeter upper, mid and lower cervical lymph nodes bilaterally. There are no pathologically enlarged lymph nodes by size criteria. =CT chest/A/P: Diffuse subcutaneous edema in the partially imaged right upper extremity. Indeterminate nodular soft tissue attenuation focus again noted  abutting the anterolateral aspect aspect of the left first rib as described on the CT of the neck. No acute finding in the abdomen or pelvis.   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

## 2025-06-20 NOTE — PHYSICAL EXAM
[General Appearance - Well Nourished] : well nourished [General Appearance - Well Developed] : well developed [Sclera] : the sclera and conjunctiva were normal [Auscultation Breath Sounds / Voice Sounds] : lungs were clear to auscultation bilaterally [Heart Sounds] : normal S1 and S2 [Heart Sounds Gallop] : no gallops [Murmurs] : no murmurs [Abdomen Soft] : soft [Abdomen Tenderness] : non-tender [] : no hepato-splenomegaly [Cervical Lymph Nodes Enlarged Posterior Bilaterally] : posterior cervical [Cervical Lymph Nodes Enlarged Anterior Bilaterally] : anterior cervical [Supraclavicular Lymph Nodes Enlarged Bilaterally] : supraclavicular [FreeTextEntry1] : R deltoid 2/25, LE 0/5 [Oriented To Time, Place, And Person] : oriented to person, place, and time

## 2025-06-20 NOTE — ASSESSMENT
[FreeTextEntry1] : 83 y/o F w DM =facial rash, v sign, Holster sign, mechanics hands. =bilateral proximal muscle weakness of upper and lower limbs, dysphagia, dysphonia =12/24 KATHE : 1:2560, dsDNA negative, complements normal, sjogrens Ab normal, smith , RNP, myomarker 3 negative. CRP 21.9; CK : 3045>2569>900. Elevated AST, ALT Skin bx: consistent with psoriasis. Not interface dermatitis seen in dermatomyositis skin lesion. Very strange. =MRI thigh 12/24: diffuse edema proximal muscles , focal 1.4cm collection within distal portion of the anterior thigh musculature at the level of the mid and distal femoral diaphysis =recent hx of breast ca 2024 in remission; let arm tumor s/p amputation =lost to f/u  Pt has got no treatment despite submitting IVIG orders for DM. Pt w rapid decline. Unable to move any limbs, dysphagia. Will send pt to ER for admission and stabilization.   Plan Called ambulance for pt to go to ER. Will likely give rituxan, as IVIG has been difficult to coordinate in nursing home.

## 2025-06-25 NOTE — ASSESSMENT
[FreeTextEntry1] : Ms. Preston is an 81 y/o woman with multiple chronic conditions who is currently hospitalized for dermatomyositis and swallowing issues. She was seen by Speech/Swallow s/p MBS- tolerated regular diet with softer texture foods. Rheumatology is following her and s/p solumedrol 60mg IV and Rituximab infusion. She remains clinically stable at time of encounter.  Answered all questions, she remains free of questions at this time.

## 2025-06-25 NOTE — HEALTH RISK ASSESSMENT
[Independent] : using telephone [Some assistance needed] : managing medications [No falls in past year] : Patient reported no falls in the past year [Yes] : The patient does have visual impairment

## 2025-06-25 NOTE — HISTORY OF PRESENT ILLNESS
[Patient is stable] : patient is stable [Patient] : patient [House Calls Co-Management Patient] : [unfilled] is a House Calls co-management patient [FreeTextEntry4] : Dr. Covington (rheum) [LastSpecialistVisitDate] : 06/ [FreeTextEntry1] : the maximum effort to leave home due to comorbidities.    [FreeTextEntry2] : Ms. Preston is an 83 y/o woman who has agreed to enroll into the [facility] House Calls program. Location: [facility]   Met the patient at her bedside, and she was alone for the visit. She was alert and oriented x 4 and appears comfortable in the hospital bed. Currently, she reports that she is okay and denies new/worsening symptoms.  She was recently discharged from [facility] after a 6-month stay for reported inability to ambulate due to swelling and rashes. She reported that her ability to ambulate before discharge from the SNF was improved. She had f/u visit with Dr. Covington ([facility]), who sent her to the ED for evaluation due to concerns that she appeared more weak and sore throat/swallowing issues. Admitted and treated for dermatomyositis and swallowing concerns. She was evaluated by RD, and the MBS assessment was completed-  Regular Solids (Choose softer foods as tolerated) with Thin Liquids Multiple swallows per bolus and alternating solids and liquids. IV access- Left IJ Double lumen central catheter.  Stage 2 pressure ulcer in the sacral area Denies any falls Behavior/mood was appropriate and pleasant.    Medical Conditions 1. Hypertension - controlled with medication 2. Osteoarthritis 3. 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 Stage IA R breast ILC (pT0N1a, ER/FL +, HER2-, Ki67 47%) s/p R mastectomy/ALND (1/13 LNs +, no extracapsular extension) on Anastrazole, c/b RUE edema post-surgery, p/w weakness. 5. Hypolipidemia- not on medications 6. CAD- s/p coronary stent 2024 7. History of bowel obstruction -resolved with NGT 	8. Vision impairment - uses reading glasses  Past Surgical History:  1. Left arm amputation  2. Right mastectomy 3. Right knee replacement 4. Left hip replacement 5. Coronary stent placement  6. Hysterectomy  ADVANCED DIRECTIVES: unclear of wishes, remains FULL CODE.    DME in the home: cane, plans to discuss wheelchair with [facility]

## 2025-06-25 NOTE — COUNSELING
[Continue diet as tolerated] : continue diet as tolerated based on goals of care [Non - Smoker] : non-smoker [Use assistive device to avoid falls] : use assistive device to avoid falls [] : eye exam [FreeTextEntry2] : needs medical alert,

## 2025-06-25 NOTE — ASSESSMENT
[FreeTextEntry1] : Ms. Preston is an 83 y/o woman with multiple chronic conditions who is currently hospitalized for dermatomyositis and swallowing issues. She was seen by Speech/Swallow s/p MBS- tolerated regular diet with softer texture foods. Rheumatology is following her and s/p solumedrol 60mg IV and Rituximab infusion. She remains clinically stable at time of encounter.  Answered all questions, she remains free of questions at this time.

## 2025-06-25 NOTE — PHYSICAL EXAM
[No Acute Distress] : no acute distress [Normal Voice/Communication] : normal voice communication [Normal Sclera/Conjunctiva] : normal sclera/conjunctiva [PERRL] : pupils equal, round and reactive to light [Normal Outer Ear/Nose] : the ears and nose were normal in appearance [No JVD] : no jugular venous distention [No Respiratory Distress] : no respiratory distress [Clear to Auscultation] : lungs were clear to auscultation bilaterally [Normal Rate] : heart rate was normal  [Regular Rhythm] : with a regular rhythm [Pedal Pulses Present] : the pedal pulses are present [Normal Bowel Sounds] : normal bowel sounds [Non Tender] : non-tender [Soft] : abdomen soft [Not Distended] : not distended [No Masses] : no abdominal mass palpated [No Clubbing, Cyanosis] : no clubbing  or cyanosis of the fingernails [Oriented x3] : oriented to person, place, and time [Normal Affect] : the affect was normal [Normal Mood] : the mood was normal [Normal Insight/Judgement] : insight and judgment were intact [de-identified] : bilateral lower extremity with trace edema [de-identified] : Right mastectomy healed. Left breast present

## 2025-06-25 NOTE — PHYSICAL EXAM
[No Acute Distress] : no acute distress [Normal Voice/Communication] : normal voice communication [Normal Sclera/Conjunctiva] : normal sclera/conjunctiva [PERRL] : pupils equal, round and reactive to light [Normal Outer Ear/Nose] : the ears and nose were normal in appearance [No JVD] : no jugular venous distention [No Respiratory Distress] : no respiratory distress [Clear to Auscultation] : lungs were clear to auscultation bilaterally [Normal Rate] : heart rate was normal  [Regular Rhythm] : with a regular rhythm [Pedal Pulses Present] : the pedal pulses are present [Normal Bowel Sounds] : normal bowel sounds [Non Tender] : non-tender [Soft] : abdomen soft [Not Distended] : not distended [No Masses] : no abdominal mass palpated [No Clubbing, Cyanosis] : no clubbing  or cyanosis of the fingernails [Oriented x3] : oriented to person, place, and time [Normal Affect] : the affect was normal [Normal Mood] : the mood was normal [Normal Insight/Judgement] : insight and judgment were intact [de-identified] : bilateral lower extremity with trace edema [de-identified] : Right mastectomy healed. Left breast present

## 2025-06-25 NOTE — HISTORY OF PRESENT ILLNESS
[Patient is stable] : patient is stable [Patient] : patient [House Calls Co-Management Patient] : [unfilled] is a House Calls co-management patient [FreeTextEntry4] : Dr. Covington (rheum) [LastSpecialistVisitDate] : 06/ [FreeTextEntry1] : the maximum effort to leave home due to comorbidities.    [FreeTextEntry2] : Ms. Preston is an 83 y/o woman who has agreed to enroll into the [facility] House Calls program. Location: [facility]   Met the patient at her bedside, and she was alone for the visit. She was alert and oriented x 4 and appears comfortable in the hospital bed. Currently, she reports that she is okay and denies new/worsening symptoms.  She was recently discharged from [facility] after a 6-month stay for reported inability to ambulate due to swelling and rashes. She reported that her ability to ambulate before discharge from the SNF was improved. She had f/u visit with Dr. Covington ([facility]), who sent her to the ED for evaluation due to concerns that she appeared more weak and sore throat/swallowing issues. Admitted and treated for dermatomyositis and swallowing concerns. She was evaluated by RD, and the MBS assessment was completed-  Regular Solids (Choose softer foods as tolerated) with Thin Liquids Multiple swallows per bolus and alternating solids and liquids. IV access- Left IJ Double lumen central catheter.  Stage 2 pressure ulcer in the sacral area Denies any falls Behavior/mood was appropriate and pleasant.    Medical Conditions 1. Hypertension - controlled with medication 2. Osteoarthritis 3. 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 Stage IA R breast ILC (pT0N1a, ER/MN +, HER2-, Ki67 47%) s/p R mastectomy/ALND (1/13 LNs +, no extracapsular extension) on Anastrazole, c/b RUE edema post-surgery, p/w weakness. 5. Hypolipidemia- not on medications 6. CAD- s/p coronary stent 2024 7. History of bowel obstruction -resolved with NGT 	8. Vision impairment - uses reading glasses  Past Surgical History:  1. Left arm amputation  2. Right mastectomy 3. Right knee replacement 4. Left hip replacement 5. Coronary stent placement  6. Hysterectomy  ADVANCED DIRECTIVES: unclear of wishes, remains FULL CODE.    DME in the home: cane, plans to discuss wheelchair with [facility]

## 2025-06-25 NOTE — REVIEW OF SYSTEMS
[Lower Ext Edema] : lower extremity edema [Joint Pain] : joint pain [Skin Rash] : skin rash [Unsteady Walking] : ataxia [Negative] : Heme/Lymph

## 2025-07-02 NOTE — HISTORY OF PRESENT ILLNESS
Ynei Luna   MRN: [id number]    Department:  [facility] Emergency Department   Date of Visit:  1/24/2020           Disclosure     Insurance plans vary and the physician(s) referred by the ER may not be covered by your plan.  Please contact you tell this physician (or your personal doctor if your instructions are to return to your personal doctor) about any new or lasting problems. The primary care or specialist physician will see patients referred from the [facility] Emergency Department.  Susan Maldonado [FreeTextEntry4] : Dr. Covington (rheum) [LastSpecialistVisitDate] : 06/ [FreeTextEntry2] : 83 y/o F w/ PMHx HTN, Arthritis, Morbid obestiy, Pre-DM, HLD, soft tissue malignancy s/p multiple surgeries w/ LUE amputation 1970s, and recently dx breast CA   Dermatomyositis. Being treated with IgG and rituxan with improvement. hep b core ag positive ?- I could not find thisresult in HIE. started on tenofivir  Hypertension - on amlodipine 10, losartan 25, metoprolol 25- has not been taking the losartan or metoprolol since hosp d/c.  dysphagia- Regular Solids/soft food with Thin Liquids Multiple swallows per bolus and alternating solids and liquids. 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 [facility]  HHA 22 hours/week- needs increase

## 2025-07-02 NOTE — HISTORY OF PRESENT ILLNESS
[FreeTextEntry4] : Dr. Covington (rheum) [LastSpecialistVisitDate] : 06/ [FreeTextEntry2] : 83 y/o F w/ PMHx HTN, Arthritis, Morbid obestiy, Pre-DM, HLD, soft tissue malignancy s/p multiple surgeries w/ LUE amputation 1970s, and recently dx breast CA   Dermatomyositis. Being treated with IgG and rituxan with improvement. hep b core ag positive ?- I could not find thisresult in HIE. started on tenofivir  Hypertension - on amlodipine 10, losartan 25, metoprolol 25- has not been taking the losartan or metoprolol since hosp d/c.  dysphagia- Regular Solids/soft food with Thin Liquids Multiple swallows per bolus and alternating solids and liquids. 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 [facility]  HHA 22 hours/week- needs increase

## 2025-07-02 NOTE — PHYSICAL EXAM
[de-identified] : Right mastectomy healed. Left breast present [No Motor Deficits] : the motor exam was normal [de-identified] : no edema [de-identified] : left arm amputated.

## 2025-07-02 NOTE — PHYSICAL EXAM
[de-identified] : Right mastectomy healed. Left breast present [No Motor Deficits] : the motor exam was normal [de-identified] : no edema [de-identified] : left arm amputated.

## 2025-07-07 NOTE — HISTORY OF PRESENT ILLNESS
[de-identified] : Nellie is a pleasant 82-year-old female, referred by YOSELIN Williamson for consultation regarding R breast cancer, now s/p R radical mastectomy on 24, here for a follow-up visit.   Prior right-sided benign breast biopsy.   Bilateral screening mammogram/sonogram 2023: (BIRADS 4) -Left 10:00 N8 questionable architectural distortion--> f/u left diagnostic mammogram/sonogram -Left 10:00 N4 1.4 cm mass may correspond to mammographic finding--> f/u targeted ultrasound -Right axillary LN with cortical thickening--> ultrasound-guided biopsy  Left diagnostic mammogram/sonogram 2024: (BIRADS 4)--> f/u stereotactic biopsy -L architectural distortion posterior to course calcification -L superior N6 incidental second area of architectural distortion  Stereotactic biopsy left inner central architectural distortion 2024 (cork clip)- benign & concordant.  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 Social Hx: nonsmoker, ETOH rarely, assisted by SIMONA Ramos two days per week, lives alone ECO-1 limited by pain from R shoulder arthritis  She denies palpable breast masses, nipple discharge, inversion or skin changes.   Breast MRI 2024: -R upper central 6 mm enhancing mass, indeterminate--> f/u MRI biopsy -L lower inner small hematoma @ site of recent benign stereo bx -R 1.9 cm abnormal appearing axillary LN at site of bx-proven metastatic disease  MRI-guided right breast biopsy 2024: -R upper central (buckle): fibroadenoma - benign & concordant   genetics 2024 - negative for BRCA, VUS to TSC2  PET/CT 2024 - FDG-avid enlarged right axillary lymph node containing a bx clip, corresponds to a bx proven malignancy  - nonspecific 3 mm RUL pulm nodule, too small to characterize w/ PET -> f/u chest CT in 6 months  - FDG avid degenerative changes, right glenohumeral joint - nonspecific focus of mild FDG activity, posterolateral aspect R 5th rib w/o corresponding CT abnormality, correlate clinically for hx of trauma/pain - the remainder of the study demonstrates no evidence of FDG- avid disease, a primary lesion is not identified   2024- Nellie returns to discuss recent imaging and next steps.  She is accompanied by her HHA Rachel and her friend and neighbor Nancy who is a breast cancer survivor and underwent surgery with Dr Jud Valle in . She was evaluated by Dr. Alejandra Harrison on 2024 and pending PET/CT findings would finalize plan.  If localized disease, would plan for surgical excision, adjuvant RT and endocrine therapy with AI for period of 7-10 years.  If she has metastatic disease, would plan for AI with ribociclib.  She also sent genetic testing which is still pending.  We discussed the recent MRI findings, and MRI breast biopsy results being benign.  We will review the imaging with radiology and assess is additional imaging will better identify the potential primary lesion.  We also discussed the treatment algorithm for breast cancers of unknown primary.    B/L U/S & Right diagnostic mammo 2024 - area of distortion in the R UOQ, appears more prominent than prior mammo, likely corresponds to an irregular hypoechoic focus at 11:00 on sono -> f/u R U/S bx recommended - newly seen irregular suspicious hypoechoic mass to L 10:00 N5 -> f/u L U/S bx recommended BIRADS 4C   U/S biopsies 2024 - R 11:00 N7 (ribbon): fibroadenoma - DISCORDANT* - L 10:00 N5 (coil): benign breast tissue, benign & concordant  2024 - Nellie returns to discuss next steps after her recent biopsies. She is accompanied by her son Ted and his wife, Catherine, who is also Nellie's HCP. We discussed the right breast cancer with john disease with an unclear primary site.  We discussed options including systemic therapy, close observation, axillary dissection, and a modified radical mastectomy.  Nellie is in favor of a modified radical mastectomy given her desire for definitive treatment. We discussed the risks, benefits and alternatives of the procedure.  We also discussed post operative expectations and possible complications.  She expresses understanding and agrees to proceed.  **SURGERY** Nellie is s/p a right modified radical mastectomy on 2024, path: no residual invasive carcinoma to breast, metastatic carcinoma w/ lobular features to  LN (1.5 cm in greatest dimension), ADH, LCIS, FEA & benign nipple & skin, pT0 N1a   2024 - Nellie returns for an initial post-op visit, she is recovering appropriately. She followed w/ Dr. Ny post-op and had one drain removed, the second drain fell out incidentally earlier this week, but the site continued draining on its own until it tapered off. There is a large seroma to the chest wall, but it is not bothersome to her at all. Overall, she is recovering well.  We discussed Nellie's final pathology. She will follow-up with Dr. Harrison to discuss adjuvant options. She will also continue follow-up with Dr. Ny to manage the incision & chest wall seroma.   Nellie is s/p IR drainage of the chest wall seroma on 2024 (w/ approx 268 cc drained) She also consulted w/ Dr. Harrison post-op who recommends Anastrozole for adjuvant therapy   2024 - Nellie returns for ongoing post-op care, she is scheduled to see Dr. Ny tomorrow as well. She started Anastrozole under Dr. Harrison and is tolerating well. eNllie will follow up in 4 months.  She will undergo left breast ultrasound in November.   Nellie was admitted to [facility] in late 2024 w/ RUE & B/L LE edema and worsening PRETTY - found to have an NSTEMI s/p cath on 2024 w/ PCI stent, discharged to [facility] on ASA & Plavix.  She also had a B/L LE thigh MRI that showed R>L diffuse edema within the proximal muscle bellies c/f active myositis and MRI neck showed diffuse edema & enhancement to upper thoracic & R shoulder soft tissues likely inflammatory, IVIg was given - - muscle bx was recommended but deferred as outpatient given risks v. benefits.   3/3/2025 - Nellie is here for ongoing follow-up, her post hospitalization visit has been delayed r/t transportation coordination at her [facility].  She has continued to follow-up with rheumatology.  She is feeling slightly better.  She will hopefully be getting discharged from [facility] in 3 weeks. Nellie will follow-up with rheumatology.  She will let us know if there is any need for a muscle biopsy.  She will undergo a left mammogram and sonogram in the near future.  Provided there are no worrisome findings, she will follow-up with us in 4 months.

## 2025-07-07 NOTE — CONSULT LETTER
[Dear  ___] : Dear  [unfilled], [Consult Letter:] : I had the pleasure of evaluating your patient, [unfilled]. [Please see my note below.] : Please see my note below. [Consult Closing:] : Thank you very much for allowing me to participate in the care of this patient.  If you have any questions, please do not hesitate to contact me. [Sincerely,] : Sincerely, [FreeTextEntry2] : Edward Williamson PA [FreeTextEntry3] : Chaim Shafer MD Surgical Oncology [facility] Office: [phone] Cell: [phone] [DrDahiana  ___] : Dr. HUTCHINSON

## 2025-07-07 NOTE — PHYSICAL EXAM
[FreeTextEntry1] : Medical staff present during physical exam.  [Normal] : supple, no neck mass and thyroid not enlarged [Normal Supraclavicular Lymph Nodes] : normal supraclavicular lymph nodes [Normal Axillary Lymph Nodes] : normal axillary lymph nodes [Normal] : oriented to person, place and time, with appropriate affect [de-identified] : right chest wall incision well healed.  Left breast without any dominant masses.